# Patient Record
Sex: FEMALE | Race: BLACK OR AFRICAN AMERICAN
[De-identification: names, ages, dates, MRNs, and addresses within clinical notes are randomized per-mention and may not be internally consistent; named-entity substitution may affect disease eponyms.]

---

## 2018-03-23 ENCOUNTER — HOSPITAL ENCOUNTER (EMERGENCY)
Dept: HOSPITAL 17 - NEPE | Age: 23
LOS: 1 days | Discharge: HOME | End: 2018-03-24
Payer: SELF-PAY

## 2018-03-23 VITALS
HEART RATE: 129 BPM | SYSTOLIC BLOOD PRESSURE: 159 MMHG | OXYGEN SATURATION: 99 % | TEMPERATURE: 98.5 F | DIASTOLIC BLOOD PRESSURE: 74 MMHG | RESPIRATION RATE: 16 BRPM

## 2018-03-23 VITALS — BODY MASS INDEX: 35.92 KG/M2 | HEIGHT: 69 IN | WEIGHT: 242.51 LBS

## 2018-03-23 DIAGNOSIS — M79.642: ICD-10-CM

## 2018-03-23 DIAGNOSIS — M54.2: ICD-10-CM

## 2018-03-23 DIAGNOSIS — N92.1: Primary | ICD-10-CM

## 2018-03-23 LAB
ALBUMIN SERPL-MCNC: 4.1 GM/DL (ref 3.4–5)
ALP SERPL-CCNC: 73 U/L (ref 45–117)
ALT SERPL-CCNC: 25 U/L (ref 9–42)
AST SERPL-CCNC: 18 U/L (ref 16–38)
BASOPHILS # BLD AUTO: 0.1 TH/MM3 (ref 0–0.2)
BASOPHILS NFR BLD: 0.5 % (ref 0–2)
BILIRUB SERPL-MCNC: 0.2 MG/DL (ref 0.2–1)
BUN SERPL-MCNC: 10 MG/DL (ref 7–18)
CALCIUM SERPL-MCNC: 9.1 MG/DL (ref 8.5–10.1)
CHLORIDE SERPL-SCNC: 104 MEQ/L (ref 98–107)
CREAT SERPL-MCNC: 0.94 MG/DL (ref 0.5–1)
EOSINOPHIL # BLD: 0.1 TH/MM3 (ref 0–0.4)
EOSINOPHIL NFR BLD: 1.2 % (ref 0–4)
ERYTHROCYTE [DISTWIDTH] IN BLOOD BY AUTOMATED COUNT: 14.5 % (ref 11.6–17.2)
GFR SERPLBLD BASED ON 1.73 SQ M-ARVRAT: 92 ML/MIN (ref 89–?)
GLUCOSE SERPL-MCNC: 103 MG/DL (ref 74–106)
HCO3 BLD-SCNC: 29.1 MEQ/L (ref 21–32)
HCT VFR BLD CALC: 38.5 % (ref 35–46)
HGB BLD-MCNC: 13.3 GM/DL (ref 11.6–15.3)
LYMPHOCYTES # BLD AUTO: 3.3 TH/MM3 (ref 1–4.8)
LYMPHOCYTES NFR BLD AUTO: 31.4 % (ref 9–44)
MCH RBC QN AUTO: 28.7 PG (ref 27–34)
MCHC RBC AUTO-ENTMCNC: 34.4 % (ref 32–36)
MCV RBC AUTO: 83.4 FL (ref 80–100)
MONOCYTE #: 0.5 TH/MM3 (ref 0–0.9)
MONOCYTES NFR BLD: 4.7 % (ref 0–8)
NEUTROPHILS # BLD AUTO: 6.6 TH/MM3 (ref 1.8–7.7)
NEUTROPHILS NFR BLD AUTO: 62.2 % (ref 16–70)
PLATELET # BLD: 361 TH/MM3 (ref 150–450)
PMV BLD AUTO: 7.4 FL (ref 7–11)
PROT SERPL-MCNC: 8.8 GM/DL (ref 6.4–8.2)
RBC # BLD AUTO: 4.62 MIL/MM3 (ref 4–5.3)
SODIUM SERPL-SCNC: 141 MEQ/L (ref 136–145)
WBC # BLD AUTO: 10.6 TH/MM3 (ref 4–11)

## 2018-03-23 PROCEDURE — 85025 COMPLETE CBC W/AUTO DIFF WBC: CPT

## 2018-03-23 PROCEDURE — 83690 ASSAY OF LIPASE: CPT

## 2018-03-23 PROCEDURE — 80053 COMPREHEN METABOLIC PANEL: CPT

## 2018-03-23 PROCEDURE — 99284 EMERGENCY DEPT VISIT MOD MDM: CPT

## 2018-03-23 PROCEDURE — 84702 CHORIONIC GONADOTROPIN TEST: CPT

## 2018-03-23 PROCEDURE — 72050 X-RAY EXAM NECK SPINE 4/5VWS: CPT

## 2018-03-24 ENCOUNTER — HOSPITAL ENCOUNTER (EMERGENCY)
Dept: HOSPITAL 17 - NEPD | Age: 23
Discharge: LEFT BEFORE DISCHARGE | End: 2018-03-24
Payer: SELF-PAY

## 2018-03-24 VITALS
HEART RATE: 89 BPM | OXYGEN SATURATION: 97 % | SYSTOLIC BLOOD PRESSURE: 144 MMHG | TEMPERATURE: 97.5 F | DIASTOLIC BLOOD PRESSURE: 80 MMHG | RESPIRATION RATE: 16 BRPM

## 2018-03-24 VITALS — WEIGHT: 242.51 LBS | HEIGHT: 68 IN | BODY MASS INDEX: 36.75 KG/M2

## 2018-03-24 DIAGNOSIS — Z53.21: Primary | ICD-10-CM

## 2018-03-24 PROCEDURE — 99281 EMR DPT VST MAYX REQ PHY/QHP: CPT

## 2018-03-24 NOTE — PD
HPI


Chief Complaint:  GI Complaint


Time Seen by Provider:  21:52


Travel History


International Travel<30 days:  No


Contact w/Intl Traveler<30days:  No


Traveled to known affect area:  No





History of Present Illness


HPI


Patient is quite complaining of heavy vaginal bleeding for over months she says 

since her do ProveTreatment stopped over a year ago she's never had a normal 

period and she's having heavy vaginal bleeding which comes and goes raPatient 

is claiming complaining of heavy vaginal bleeding for over months she says 

since her depo  im 


Treatment stopped over a year ago she has never had a normal menstrual cycle 

menstrual cycle  And she is having heavy vaginal bleeding which comes and goes.

  Patient is also complaining of.  Patient is also complaining of left hand 

pain which she has been dealing with off and on for years she said she had an 

injury as a child and now she is worri that there is something wrong with her 

head ed left hand pain which she's been dealing with off and on for years she 

said she had an injury as a child and now she is worried there is something 

wrong with her hand





PFS


Past Medical History


Medical History:  Denies Significant Hx


Diminished Hearing:  No


Immunizations Current:  Yes


Influenza Vaccination:  No


Pregnant?:  Unknown


LMP:  UNKNOWN


:  0





Past Surgical History


Surgical History:  No Previous Surgery





Social History


Alcohol Use:  Yes (SOCIAL)


Tobacco Use:  No


Substance Use:  No





Allergies-Medications


(Allergen,Severity, Reaction):  


Coded Allergies:  


     No Known Allergies (Verified , 3/24/18)


Reported Meds & Prescriptions





Reported Meds & Active Scripts


Active


Ibuprofen 600 Mg Tab 600 Mg PO Q6H PRN


Reported


Depo-Provera Inj (Medroxyprogesterone Inj) 150 Mg/Ml Inj 150 Mg IM Q90D








Review of Systems


Except as stated in HPI:  all other systems reviewed are Neg


Genitourinary:  Positive: Menorrhagia, Metorrhagia





Physical Exam


Narrative


GENERAL: Nontoxic-appearing awake alertNontoxic appearing awake alert


SKIN: Warm and dry.


HEAD: Atraumatic. Normocephalic. 


EYES: Pupils equal and round. No scleral icterus. No injection or drainage. 


ENT: No nasal bleeding or discharge.  Mucous membranes pink and moist.


NECK: Trachea midline. No JVD. 


CARDIOVASCULAR: Regular rate and rhythm.  


RESPIRATORY: No accessory muscle use. Clear to auscultation. Breath sounds 

equal bilaterally. 


GASTROINTESTINAL: Abdomen mildly obese mildly obese soft, non-tender, 

nondistended. Hepatic and splenic margins not palpable. 


MUSCULOSKELETAL: Extremities without clubbing, cyanosis, or edema. No obvious 

deformities.  No deformity to her hand No deformity to her hand


NEUROLOGICAL: Awake and alert. No obvious cranial nerve deficits.  Motor 

grossly within normal limits. Five out of 5 muscle strength in the arms and 

legs.  Normal speech.


PSYCHIATRIC: Appropriate mood and affect; insight and judgment normal.





Data


Data


Last Documented VS





Vital Signs








  Date Time  Temp Pulse Resp B/P (MAP) Pulse Ox O2 Delivery O2 Flow Rate FiO2


 


3/24/18 01:00        


 


3/23/18 21:21 98.5 129 16  99 Room Air  








Orders





 Orders


Complete Blood Count With Diff (3/23/18 22:06)


Comprehensive Metabolic Panel (3/23/18 22:06)


Lipase (3/23/18 22:06)


Beta Hcg (Quant/Titer) (3/23/18 22:06)


Spine, Cervical Compl([id number]) (3/23/18 )


Ed Discharge Order (3/24/18 00:44)





Labs





Laboratory Tests








Test


  3/23/18


22:00


 


White Blood Count 10.6 TH/MM3 


 


Red Blood Count 4.62 MIL/MM3 


 


Hemoglobin 13.3 GM/DL 


 


Hematocrit 38.5 % 


 


Mean Corpuscular Volume 83.4 FL 


 


Mean Corpuscular Hemoglobin 28.7 PG 


 


Mean Corpuscular Hemoglobin


Concent 34.4 % 


 


 


Red Cell Distribution Width 14.5 % 


 


Platelet Count 361 TH/MM3 


 


Mean Platelet Volume 7.4 FL 


 


Neutrophils (%) (Auto) 62.2 % 


 


Lymphocytes (%) (Auto) 31.4 % 


 


Monocytes (%) (Auto) 4.7 % 


 


Eosinophils (%) (Auto) 1.2 % 


 


Basophils (%) (Auto) 0.5 % 


 


Neutrophils # (Auto) 6.6 TH/MM3 


 


Lymphocytes # (Auto) 3.3 TH/MM3 


 


Monocytes # (Auto) 0.5 TH/MM3 


 


Eosinophils # (Auto) 0.1 TH/MM3 


 


Basophils # (Auto) 0.1 TH/MM3 


 


CBC Comment DIFF FINAL 


 


Differential Comment  


 


Blood Urea Nitrogen 10 MG/DL 


 


Creatinine 0.94 MG/DL 


 


Random Glucose 103 MG/DL 


 


Total Protein 8.8 GM/DL 


 


Albumin 4.1 GM/DL 


 


Calcium Level 9.1 MG/DL 


 


Alkaline Phosphatase 73 U/L 


 


Aspartate Amino Transf


(AST/SGOT) 18 U/L 


 


 


Alanine Aminotransferase


(ALT/SGPT) 25 U/L 


 


 


Total Bilirubin 0.2 MG/DL 


 


Sodium Level 141 MEQ/L 


 


Potassium Level 3.4 MEQ/L 


 


Chloride Level 104 MEQ/L 


 


Carbon Dioxide Level 29.1 MEQ/L 


 


Anion Gap 8 MEQ/L 


 


Estimat Glomerular Filtration


Rate 92 ML/MIN 


 


 


Lipase 133 U/L 


 


Human Chorionic Gonadotropin,


Quant LESS THAN 1


MIU/ML











MDM


Medical Decision Making


Medical Screen Exam Complete:  Yes


Emergency Medical Condition:  Yes


Differential Diagnosis


pregnancy  threatened AB   missed AB   ,  hormone  imbalance  other ..


Narrative Course


Pt not pregnant and no sign of heavy vaginal bleed.   H and H normal  , not 

tachycardic  no hypotension, d/c follow up gyn





Diagnosis





 Primary Impression:  


 Metrorrhagia


Patient Instructions:  General Instructions, Menorrhagia (ED)


Scripts


Ibuprofen (Ibuprofen) 600 Mg Tab


600 MG PO Q6H Y for Pain/Inflammation, #20 TAB 0 Refills


   Prov: Jorge Araya MD         3/24/18











Jorge Araya MD Mar 24, 2018 00:51

## 2018-03-24 NOTE — RADRPT
EXAM DATE/TIME:  03/24/2018 00:01 

 

[facility] COMPARISON:     

No previous studies available for comparison.

 

                     

INDICATIONS :     

Neck pain on and off.

                     

 

MEDICAL HISTORY :     

None.          

 

SURGICAL HISTORY :     

None.   

 

ENCOUNTER:     

Initial                                        

 

ACUITY:     

7 - 11 months      

 

PAIN SCORE:     

2/10

 

LOCATION:     

Bilateral neck 

 

FINDINGS:     

Five view examination was performed.  There is normal alignment of the vertebral bodies down to the l
evel of C7.  There is mild reversal of the normal cervical lordosis. No evidence of fracture or sublu
xation.  Vertebral body height is normal.  The disc spaces are maintained.  The prevertebral soft tis
sues are of normal thickness.  The atlanto-axial articulation is intact.  The bony neural foramen are
 patent bilaterally.

 

CONCLUSION:     

1. Mild reversal of the normal cervical lordosis which may be due to muscular spasm and/or positionin
g.

2. No underlying bony abnormality.

 

 

 

 Gume Dominique MD on March 24, 2018 at 0:28           

Board Certified Radiologist.

 This report was verified electronically.

## 2018-04-01 ENCOUNTER — HOSPITAL ENCOUNTER (EMERGENCY)
Dept: HOSPITAL 17 - NEPC | Age: 23
Discharge: HOME | End: 2018-04-01
Payer: SELF-PAY

## 2018-04-01 VITALS
TEMPERATURE: 98.2 F | HEART RATE: 93 BPM | DIASTOLIC BLOOD PRESSURE: 63 MMHG | RESPIRATION RATE: 18 BRPM | OXYGEN SATURATION: 99 % | SYSTOLIC BLOOD PRESSURE: 127 MMHG

## 2018-04-01 VITALS — WEIGHT: 231.49 LBS | HEIGHT: 68 IN | BODY MASS INDEX: 35.08 KG/M2

## 2018-04-01 DIAGNOSIS — N39.0: Primary | ICD-10-CM

## 2018-04-01 LAB
ALBUMIN SERPL-MCNC: 4.3 GM/DL (ref 3.4–5)
ALP SERPL-CCNC: 78 U/L (ref 45–117)
ALT SERPL-CCNC: 29 U/L (ref 9–42)
AST SERPL-CCNC: 32 U/L (ref 16–38)
BACTERIA #/AREA URNS HPF: (no result) /HPF
BASOPHILS # BLD AUTO: 0.6 TH/MM3 (ref 0–0.2)
BASOPHILS NFR BLD AUTO: 1 % (ref 0–2)
BASOPHILS NFR BLD: 6.4 % (ref 0–2)
BILIRUB SERPL-MCNC: 0.6 MG/DL (ref 0.2–1)
BUN SERPL-MCNC: 9 MG/DL (ref 7–18)
CALCIUM SERPL-MCNC: 9.4 MG/DL (ref 8.5–10.1)
CHLORIDE SERPL-SCNC: 103 MEQ/L (ref 98–107)
COLOR UR: YELLOW
CREAT SERPL-MCNC: 0.97 MG/DL (ref 0.5–1)
CRP SERPL-MCNC: 2.1 MG/DL (ref 0–0.3)
EOSINOPHIL # BLD: 0.3 TH/MM3 (ref 0–0.4)
EOSINOPHIL NFR BLD: 3.9 % (ref 0–4)
ERYTHROCYTE [DISTWIDTH] IN BLOOD BY AUTOMATED COUNT: 14.2 % (ref 11.6–17.2)
GFR SERPLBLD BASED ON 1.73 SQ M-ARVRAT: 89 ML/MIN (ref 89–?)
GLUCOSE SERPL-MCNC: 94 MG/DL (ref 74–106)
GLUCOSE UR STRIP-MCNC: (no result) MG/DL
HCO3 BLD-SCNC: 28.6 MEQ/L (ref 21–32)
HCT VFR BLD CALC: 37.7 % (ref 35–46)
HGB BLD-MCNC: 12.8 GM/DL (ref 11.6–15.3)
HGB UR QL STRIP: (no result)
KETONES UR STRIP-MCNC: 10 MG/DL
LYMPHOCYTES # BLD AUTO: 1.9 TH/MM3 (ref 1–4.8)
LYMPHOCYTES NFR BLD AUTO: 22.2 % (ref 9–44)
LYMPHOCYTES: 41 % (ref 9–44)
MCH RBC QN AUTO: 27.8 PG (ref 27–34)
MCHC RBC AUTO-ENTMCNC: 33.8 % (ref 32–36)
MCV RBC AUTO: 82.2 FL (ref 80–100)
MONOCYTE #: 0.4 TH/MM3 (ref 0–0.9)
MONOCYTES NFR BLD: 5 % (ref 0–8)
MONOCYTES: 4 % (ref 0–8)
MUCOUS THREADS #/AREA URNS LPF: (no result) /LPF
NEUTROPHILS # BLD AUTO: 5.5 TH/MM3 (ref 1.8–7.7)
NEUTROPHILS NFR BLD AUTO: 62.5 % (ref 16–70)
NEUTS BAND # BLD MANUAL: 4.8 TH/MM3 (ref 1.8–7.7)
NEUTS SEG NFR BLD MANUAL: 54 % (ref 16–70)
NITRITE UR QL STRIP: (no result)
PLATELET # BLD: 370 TH/MM3 (ref 150–450)
PMV BLD AUTO: 7.8 FL (ref 7–11)
PROT SERPL-MCNC: 8.9 GM/DL (ref 6.4–8.2)
RBC # BLD AUTO: 4.59 MIL/MM3 (ref 4–5.3)
SODIUM SERPL-SCNC: 138 MEQ/L (ref 136–145)
SP GR UR STRIP: 1.04 (ref 1–1.03)
URINE LEUKOCYTE ESTERASE: (no result)
WBC # BLD AUTO: 8.8 TH/MM3 (ref 4–11)

## 2018-04-01 PROCEDURE — 84703 CHORIONIC GONADOTROPIN ASSAY: CPT

## 2018-04-01 PROCEDURE — 80053 COMPREHEN METABOLIC PANEL: CPT

## 2018-04-01 PROCEDURE — 85027 COMPLETE CBC AUTOMATED: CPT

## 2018-04-01 PROCEDURE — 99284 EMERGENCY DEPT VISIT MOD MDM: CPT

## 2018-04-01 PROCEDURE — 83690 ASSAY OF LIPASE: CPT

## 2018-04-01 PROCEDURE — 81001 URINALYSIS AUTO W/SCOPE: CPT

## 2018-04-01 PROCEDURE — 85007 BL SMEAR W/DIFF WBC COUNT: CPT

## 2018-04-01 PROCEDURE — 96360 HYDRATION IV INFUSION INIT: CPT

## 2018-04-01 PROCEDURE — 86140 C-REACTIVE PROTEIN: CPT

## 2018-04-01 NOTE — PD
HPI


Chief Complaint:  Back/ Neck Pain or Injury


Time Seen by Provider:  05:43


Travel History


International Travel<30 days:  No


Contact w/Intl Traveler<30days:  No


Traveled to known affect area:  No





History of Present Illness


HPI


The patient is a 22 year old female who presents to the [facility] 

emergency department with a history of low back pain that began in the 

afternoon on Saturday.  The patient reports that it is across the low back.  

She reports that she has had similar pain in the past when she has had a 

urinary tract infection.  She denies having any dysuria, however she reports 

having urinary frequency and urgency.  She denies having any recent fevers.  

She denies having any vomiting she denies having any vaginal discharge.  

Otherwise on review of systems, she denies having any recent cough or congestion

, neck pain, chest pain, shortness of breath, or neurologic symptoms. She has 

had chronic loss stools for 3 months, after eating.  LMP: unknown. She has been 

off of Depo Provera since 2016 and continues to have irregular menstrual 

cycles.





[facility]


Past Medical History


*** Narrative Medical


The patient's past medical history is significant for irregular menstrual 

cycles.


Medical History:  Denies Significant Hx


Diminished Hearing:  No


Immunizations Current:  Yes


Tetanus Vaccination:  Unknown


Influenza Vaccination:  No


Pregnant?:  Not Pregnant


LMP:  Depo


:  0





Past Surgical History


Surgical History:  No Previous Surgery





Social History


Alcohol Use:  Yes (SOCIAL)


Tobacco Use:  No


Substance Use:  No





Allergies-Medications


(Allergen,Severity, Reaction):  


Coded Allergies:  


     No Known Allergies (Verified , 18)


Reported Meds & Prescriptions





Reported Meds & Active Scripts


Active


Ibuprofen 600 Mg Tab 600 Mg PO Q6H PRN


Reported


Depo-Provera Inj (Medroxyprogesterone Inj) 150 Mg/Ml Inj 150 Mg IM Q90D








Review of Systems


Except as stated in HPI:  all other systems reviewed are Neg


General / Constitutional:  No: Fever


Eyes:  No: Visual changes


HENT:  No: Headaches


Cardiovascular:  No: Chest Pain or Discomfort


Respiratory:  No: Shortness of Breath


Gastrointestinal:  No: Abdominal Pain


Genitourinary:  Positive: Urgency, Frequency, No: Dysuria


Musculoskeletal:  Positive: Myalgias, No: Pain


Skin:  No Rash


Neurologic:  No: Weakness, Change in Mentation, Slurred Speech, Sensory 

Disturbance


Psychiatric:  No: Depression


Endocrine:  No: Polydipsia


Hematologic/Lymphatic:  No: Easy Bruising





Physical Exam


Narrative


General: 


The patient is a well-developed well-nourished female in no acute distress. 





Head and Neck exam: 


Head is normocephalic atraumatic. 


Eyes: EOMI, pupils are equal round and reactive to light. 


Nose: Midline septum with pink mucous membranes 


Mouth: Dentition unremarkable. Moist mucus membranes. Posterior oropharynx is 

not erythematous. No tonsillar hypertrophy. Uvula midline. Airway patent. 


Neck: No palpable lymphadenopathy. No nuchal rigidity. No thyromegaly. 





Cardiovascular: 


Regular rate and rhythm without murmurs, gallops, or rubs. 





Lungs: 


Clear to auscultation bilaterally. No wheezes, rhonchi, or rales.


 


Abdomen:


Soft, without tenderness to palpation in all 4 quadrants of the abdomen. No 

guarding, rebound, or rigidity.  Normal bowel sounds are audible.  No 

tenderness on palpation of McBurney's point.  Negative Rojas sign.





Extremities: 


No clubbing, cyanosis, or edema. 2+ pulses in all 4 extremities.  No calf 

tenderness on palpation.





Back: 


No spinous process tenderness to palpation.  The patient reports bilateral CVA 

tenderness on palpation.





Neurologic Exam: Grossly nonfocal





Skin Exam: No rash noted. Intact skin that is warm and dry.





Data


Data


Last Documented VS





Vital Signs








  Date Time  Temp Pulse Resp B/P (MAP) Pulse Ox O2 Delivery O2 Flow Rate FiO2


 


18 05:28 98.2 93 18 127/63 (84) 99   








Orders





 Orders


Complete Blood Count With Diff (18 05:43)


Comprehensive Metabolic Panel (18 05:43)


C-Reactive Protein (Crp) (18 05:43)


Lipase (18 05:43)


Urinalysis - C+S If Indicated (18 05:43)


Iv Access Insert/Monitor (18 05:43)


Ecg Monitoring (18 05:43)


Oximetry (18 05:43)


Ed Urine Pregnancytest Poc (18 05:43)


Ibuprofen (Motrin) (18 06:30)





Labs





Laboratory Tests








Test


  18


06:00


 


White Blood Count 8.8 TH/MM3 


 


Red Blood Count 4.59 MIL/MM3 


 


Hemoglobin 12.8 GM/DL 


 


Hematocrit 37.7 % 


 


Mean Corpuscular Volume 82.2 FL 


 


Mean Corpuscular Hemoglobin 27.8 PG 


 


Mean Corpuscular Hemoglobin


Concent 33.8 % 


 


 


Red Cell Distribution Width 14.2 % 


 


Platelet Count 370 TH/MM3 


 


Mean Platelet Volume 7.8 FL 


 


Neutrophils (%) (Auto) 62.5 % 


 


Lymphocytes (%) (Auto) 22.2 % 


 


Monocytes (%) (Auto) 5.0 % 


 


Eosinophils (%) (Auto) 3.9 % 


 


Basophils (%) (Auto) 6.4 % 


 


Neutrophils # (Auto) 5.5 TH/MM3 


 


Lymphocytes # (Auto) 1.9 TH/MM3 


 


Monocytes # (Auto) 0.4 TH/MM3 


 


Eosinophils # (Auto) 0.3 TH/MM3 


 


Basophils # (Auto) 0.6 TH/MM3 


 


CBC Comment AUTO DIFF 


 


Urine Color YELLOW 


 


Urine Turbidity HAZY 


 


Urine pH 6.0 


 


Urine Specific Gravity 1.036 


 


Urine Protein 30 mg/dL 


 


Urine Glucose (UA) NEG mg/dL 


 


Urine Ketones 10 mg/dL 


 


Urine Occult Blood SMALL 


 


Urine Nitrite NEG 


 


Urine Bilirubin NEG 


 


Urine Urobilinogen 2.0 MG/DL 


 


Urine Leukocyte Esterase MOD 


 


Urine RBC 1 /hpf 


 


Urine WBC 5 /hpf 


 


Urine Bacteria RARE /hpf 


 


Urine Mucus MANY /lpf 


 


Microscopic Urinalysis Comment


  CULT NOT


INDICATED


 


Blood Urea Nitrogen 9 MG/DL 


 


Creatinine 0.97 MG/DL 


 


Random Glucose 94 MG/DL 


 


Total Protein 8.9 GM/DL 


 


Albumin 4.3 GM/DL 


 


Calcium Level 9.4 MG/DL 


 


Alkaline Phosphatase 78 U/L 


 


Aspartate Amino Transf


(AST/SGOT) 32 U/L 


 


 


Alanine Aminotransferase


(ALT/SGPT) 29 U/L 


 


 


Total Bilirubin 0.6 MG/DL 


 


Sodium Level 138 MEQ/L 


 


Potassium Level 3.4 MEQ/L 


 


Chloride Level 103 MEQ/L 


 


Carbon Dioxide Level 28.6 MEQ/L 


 


Anion Gap 6 MEQ/L 


 


Estimat Glomerular Filtration


Rate 89 ML/MIN 


 


 


C-Reactive Protein 2.10 MG/DL 


 


Lipase 96 U/L 











[facility]


Medical Decision Making


Medical Screen Exam Complete:  Yes


Emergency Medical Condition:  Yes


Medical Record Reviewed:  Yes


Differential Diagnosis


Pregnancy, versus musculoskeletal strain, versus urinary tract infection, 

versus diabetes


Narrative Course


During the course of the patient's emergency department visit, the patient's 

history, examination, and differential diagnosis were reviewed with the 

patient. The patient was placed on a cardiac monitor with oximetry and frequent 

blood pressure monitoring. The patient had  IV access obtained and blood work 

sent for analysis. 





The patient was initially provided ibuprofen for pain.  She denies taking 

anything for pain prior to arrival.





The patient's laboratory studies were reviewed and remarkable for a white count 

of 8.8, hemoglobin 12.8, platelets 370 with a normal differential, CMP is 

remarkable for potassium of 3.4, C-reactive protein 2.1, lipase 96, urinalysis 

shows concentrated urine 30 protein 10 ketones small occult blood moderate 

leukocyte esterase 1 RBC 5 WBCs, rare bacteria.  The patient's bedside 

pregnancy test is negative.





The patient will be discharged home with a prescription for antibiotic as the 

patient has symptoms consistent with urinary tract infection and pyuria noted.  

The patient will be discharged home with Macrobid.





The patient is resting comfortably and feels better, is alert and in no 

distress. The patient's results and examination findings were discussed with 

the patient. The repeat examination is unremarkable and benign. The history, 

exam, diagnostic testing, and current condition do not suggest any significant 

pathology to warrant further testing, continued ED treatment, admission, or 

surgical evaluation at this point. The vital signs have been stable. The 

patient does not have uncontrollable pain, intractable vomiting, or other 

significant symptoms. The patient's condition is stable and appropriate for 

discharge. The patient will pursue further outpatient evaluation with a primary 

care physician or other designated or consulting physician as indicated in the 

discharge instructions. The patient expressed understanding and was agreeable 

with this plan.





Diagnosis





 Primary Impression:  


 Urinary tract infection


 Qualified Codes:  N39.0 - Urinary tract infection, site not specified; R31.9 - 

Hematuria, unspecified


Referrals:  


Gynecologist


1 week





[facility]


1 week





Primary Care Physician


3 days


Patient Instructions:  General Instructions, Urinary Tract Infection in Women (

ED)


***Med/Other Pt SpecificInfo:  Prescription(s) given


Scripts


Nitrofurantoin Monohydrate Macrocrystals (Macrobid) 100 Mg Capsule


100 MG PO BID for Infection for 10 Days, #20 CAP 0 Refills


   Prov: Tatiana Ruano MD         18


Disposition:  01 DISCHARGE HOME


Condition:  Stable











Tatiana Ruano MD 2018 06:00

## 2018-04-05 ENCOUNTER — HOSPITAL ENCOUNTER (EMERGENCY)
Dept: HOSPITAL 17 - NEPE | Age: 23
Discharge: HOME | End: 2018-04-05
Payer: SELF-PAY

## 2018-04-05 VITALS — WEIGHT: 240.52 LBS | BODY MASS INDEX: 36.45 KG/M2 | HEIGHT: 68 IN

## 2018-04-05 VITALS
HEART RATE: 102 BPM | DIASTOLIC BLOOD PRESSURE: 79 MMHG | RESPIRATION RATE: 18 BRPM | SYSTOLIC BLOOD PRESSURE: 141 MMHG | TEMPERATURE: 97.7 F | OXYGEN SATURATION: 100 %

## 2018-04-05 DIAGNOSIS — M79.671: ICD-10-CM

## 2018-04-05 DIAGNOSIS — M79.672: Primary | ICD-10-CM

## 2018-04-05 DIAGNOSIS — M79.89: ICD-10-CM

## 2018-04-05 PROCEDURE — 99283 EMERGENCY DEPT VISIT LOW MDM: CPT

## 2018-04-05 NOTE — PD
HPI


Chief Complaint:  Edema


Time Seen by Provider:  05:52


Travel History


International Travel<30 days:  No


Contact w/Intl Traveler<30days:  No


Traveled to known affect area:  No





History of Present Illness


HPI


Patient is a 22-year-old female presents emergency department for bilateral 

foot swelling and pain.  Patient recently on antibiotics for upper respiratory 

type symptoms.  No fevers no trauma no history of long stasis periods.  Patient 

states pain started a few days ago, gradually worsening.  She currently is 

unemployed does not spend an inordinate amount of time on her feet.  No history 

of gout.  No history of arthritis according to her peer





[facility]


Past Medical History


Medical History:  Denies Significant Hx


Diminished Hearing:  No


Immunizations Current:  Yes


Tetanus Vaccination:  < 5 Years


Influenza Vaccination:  No


Pregnant?:  Unknown


LMP:  2018


:  0


Para:  0





Past Surgical History


Surgical History:  No Previous Surgery





Social History


Alcohol Use:  No


Tobacco Use:  No


Substance Use:  No





Allergies-Medications


(Allergen,Severity, Reaction):  


Uncoded Allergies:  


     UNKNOWN ANTIBIOTIC (Allergy, Severe, Hives, 18)


Reported Meds & Prescriptions





Reported Meds & Active Scripts


Active


Ibuprofen 800 Mg Tab 800 Mg PO Q6HR PRN


Prednisone 20 Mg Tab 60 Mg PO DAILY 5 Days


Macrobid (Nitrofurantoin Monohydrate Macrocrystals) 100 Mg Capsule 100 Mg PO 

BID 10 Days


Ibuprofen 600 Mg Tab 600 Mg PO Q6H PRN


Reported


Depo-Provera Inj (Medroxyprogesterone Inj) 150 Mg/Ml Inj 150 Mg IM Q90D








Review of Systems


Except as stated in HPI:  all other systems reviewed are Neg





Physical Exam


Narrative


GENERAL: Well-nourished, well-developed patient.


SKIN: Focused skin assessment warm/dry.


HEAD: Normocephalic.


EYES: No scleral icterus. No injection or drainage. 


NECK: Supple, trachea midline. No JVD or lymphadenopathy.


CARDIOVASCULAR: Regular rate and rhythm without murmurs, gallops, or rubs. 


RESPIRATORY: Breath sounds equal bilaterally. No accessory muscle use.


GASTROINTESTINAL: Abdomen soft, non-tender, nondistended. 


MUSCULOSKELETAL: No cyanosis, there is perhaps trace edema bilateral lower 

extremities from ankles distally, is bilaterally equal, 2+ bilateral equal 

pulses and DPs and PTs.  Homans sign negative.  There is no calf swelling at 

all.  The feet are somewhat warm, there is some tenderness over the first MTP 

joint bilaterally.


BACK: Nontender without obvious deformity. No CVA tenderness.





Data


Data


Last Documented VS





Vital Signs








  Date Time  Temp Pulse Resp B/P (MAP) Pulse Ox O2 Delivery O2 Flow Rate FiO2


 


18 06:02  102 16  100   


 


18 05:44 97.7   141/79 (99)    








Orders





 Orders


Ibuprofen (Motrin) (18 06:15)


Ed Discharge Order (18 06:10)








MDM


Medical Decision Making


Medical Screen Exam Complete:  Yes


Emergency Medical Condition:  Yes


Differential Diagnosis


Arthritis, Gouty arthritis, Infectious arthritis unlikely.


Narrative Course


Patient room to the emergency department, symptoms suggestive of reactive 

arthritis consider bilateral gout although this would be somewhat unusual to 

have bilateral abruption.  However I think that given her exam reactive 

arthritis is a possibility.  Discussed empiric steroids, symptomatically 

management follow-up with a primary care physician.  She is stable for 

discharge per





Diagnosis





 Primary Impression:  


 Foot pain, bilateral


***Med/Other Pt SpecificInfo:  Prescription(s) given


Scripts


Prednisone (Prednisone) 20 Mg Tab


60 MG PO DAILY for 5 Days, #15 TAB 0 Refills


   Prov: Brian Carvajal MD         18


Disposition:  01 DISCHARGE HOME


Condition:  Stable











Brian Carvajal MD 2018 06:10

## 2018-04-06 ENCOUNTER — HOSPITAL ENCOUNTER (EMERGENCY)
Dept: HOSPITAL 17 - NEPD | Age: 23
LOS: 1 days | Discharge: HOME | End: 2018-04-07
Payer: SELF-PAY

## 2018-04-06 ENCOUNTER — HOSPITAL ENCOUNTER (EMERGENCY)
Dept: HOSPITAL 17 - NED | Age: 23
Discharge: LEFT BEFORE DISCHARGE | End: 2018-04-06
Payer: SELF-PAY

## 2018-04-06 VITALS — BODY MASS INDEX: 30.74 KG/M2 | HEIGHT: 68 IN | WEIGHT: 202.83 LBS

## 2018-04-06 VITALS
DIASTOLIC BLOOD PRESSURE: 74 MMHG | SYSTOLIC BLOOD PRESSURE: 133 MMHG | RESPIRATION RATE: 18 BRPM | OXYGEN SATURATION: 100 % | TEMPERATURE: 98.6 F | HEART RATE: 87 BPM

## 2018-04-06 VITALS
RESPIRATION RATE: 16 BRPM | OXYGEN SATURATION: 100 % | HEART RATE: 76 BPM | SYSTOLIC BLOOD PRESSURE: 128 MMHG | TEMPERATURE: 97.4 F | DIASTOLIC BLOOD PRESSURE: 73 MMHG

## 2018-04-06 VITALS — HEIGHT: 68 IN | BODY MASS INDEX: 30.74 KG/M2 | WEIGHT: 202.83 LBS

## 2018-04-06 DIAGNOSIS — Z53.21: ICD-10-CM

## 2018-04-06 DIAGNOSIS — M79.89: Primary | ICD-10-CM

## 2018-04-06 DIAGNOSIS — R60.0: ICD-10-CM

## 2018-04-06 DIAGNOSIS — Z79.3: ICD-10-CM

## 2018-04-06 DIAGNOSIS — M25.572: ICD-10-CM

## 2018-04-06 DIAGNOSIS — M25.571: Primary | ICD-10-CM

## 2018-04-06 PROCEDURE — 86140 C-REACTIVE PROTEIN: CPT

## 2018-04-06 PROCEDURE — 85025 COMPLETE CBC W/AUTO DIFF WBC: CPT

## 2018-04-06 PROCEDURE — 99281 EMR DPT VST MAYX REQ PHY/QHP: CPT

## 2018-04-06 PROCEDURE — 73610 X-RAY EXAM OF ANKLE: CPT

## 2018-04-06 PROCEDURE — 96374 THER/PROPH/DIAG INJ IV PUSH: CPT

## 2018-04-06 PROCEDURE — 80048 BASIC METABOLIC PNL TOTAL CA: CPT

## 2018-04-06 PROCEDURE — 99284 EMERGENCY DEPT VISIT MOD MDM: CPT

## 2018-04-06 PROCEDURE — 86430 RHEUMATOID FACTOR TEST QUAL: CPT

## 2018-04-06 PROCEDURE — 85652 RBC SED RATE AUTOMATED: CPT

## 2018-04-06 NOTE — PD
HPI


Chief Complaint:  Edema


Time Seen by Provider:  23:21


Travel History


International Travel<30 days:  No


Contact w/Intl Traveler<30days:  No


Traveled to known affect area:  No





History of Present Illness


HPI


22-year-old female came to the emergency room with history of bilateral ankle 

swelling and pain for past 3 days.  Patient was in the emergency room 2 days 

ago when she was evaluated and was asked to keep her feet elevated.  She did 

not find any relief or improvement of her symptoms from it.  She is back here 

since the condition continues.  Vital signs were stable.  Patient denies having 

these symptoms in the past.  However when I look back at her past medical 

history I noticed that at one point she was being worked up for polyarthritis.  

This was when patient was 7 years old.  She even went through OT for that.  

Patient does not recall the details about this since she was very young.





PFSH


Past Medical History


*** Narrative Medical


List of her past medical, surgical, social and family history is reviewed from 

the nursing note.


Diminished Hearing:  No


Immunizations Current:  Yes


Pregnant?:  Not Pregnant


LMP:  UNKNOWN


:  0


Para:  0





Social History


Alcohol Use:  No


Tobacco Use:  No


Substance Use:  No





Allergies-Medications


(Allergen,Severity, Reaction):  


Uncoded Allergies:  


     UNKNOWN ANTIBIOTIC (Allergy, Severe, Hives, 18)


Comments


List of her allergies reviewed from the nursing


Reported Meds & Prescriptions





Reported Meds & Active Scripts


Active


Ibuprofen 800 Mg Tab 800 Mg PO Q6HR PRN


Prednisone 20 Mg Tab 60 Mg PO DAILY 5 Days


Macrobid (Nitrofurantoin Monohydrate Macrocrystals) 100 Mg Capsule 100 Mg PO 

BID 10 Days


Ibuprofen 600 Mg Tab 600 Mg PO Q6H PRN


Reported


Depo-Provera Inj (Medroxyprogesterone Inj) 150 Mg/Ml Inj 150 Mg IM Q90D





Narrative Medication


List of her home medications reviewed from the nursing note.





Review of Systems


Except as stated in HPI:  all other systems reviewed are Neg


Musculoskeletal:  Positive: Edema, Pain





Physical Exam


Narrative


GENERAL: Awake, alert, no obvious distress


SKIN: Focused skin assessment warm/dry.


HEAD: Atraumatic. Normocephalic. 


EYES: Pupils equal and round. No scleral icterus. No injection or drainage. 


ENT: No nasal bleeding or discharge.  Mucous membranes pink and moist.


NECK: Trachea midline. No JVD. 


CARDIOVASCULAR: Regular rate and rhythm.  No murmur appreciated.


RESPIRATORY: No accessory muscle use. Clear to auscultation. Breath sounds 

equal bilaterally. 


GASTROINTESTINAL: Abdomen soft, non-tender, nondistended. Hepatic and splenic 

margins not palpable. 


MUSCULOSKELETAL: No obvious deformities. No clubbing.  No cyanosis.  Bilateral 

ankle swelling and some swelling of the dorsum of her feet.  Ankle and the 

dorsum of the foot is tender to touch.  No erythema or warmth of the skin


NEUROLOGICAL: Awake and alert. No obvious cranial nerve deficits.  Motor 

grossly within normal limits. Normal speech.


PSYCHIATRIC: Appropriate mood and affect; insight and judgment normal.





Data


Data


Last Documented VS





Vital Signs








  Date Time  Temp Pulse Resp B/P (MAP) Pulse Ox O2 Delivery O2 Flow Rate FiO2


 


18 23:10 97.4 76 16 128/73 (91) 100   








Orders





 Orders


Ankle, Complete ([id number]) (18 )


Ankle, Complete ([id number]) (18 )


Complete Blood Count With Diff (18 23:31)


Basic Metabolic Panel (Bmp) (18 23:31)


Westergren Sedimentation Rate (18 23:40)


Rheumatoid Screen/Titer (Rf) (18 23:51)


Ketorolac Inj (Toradol Inj) (18 00:00)


C-Reactive Protein (Crp) (18 00:47)


Ed Discharge Order (18 03:06)





Labs





Laboratory Tests








Test


  18


00:47


 


White Blood Count 7.7 TH/MM3 


 


Red Blood Count 4.52 MIL/MM3 


 


Hemoglobin 12.1 GM/DL 


 


Hematocrit 37.0 % 


 


Mean Corpuscular Volume 81.9 FL 


 


Mean Corpuscular Hemoglobin 26.8 PG 


 


Mean Corpuscular Hemoglobin


Concent 32.7 % 


 


 


Red Cell Distribution Width 14.3 % 


 


Platelet Count 373 TH/MM3 


 


Mean Platelet Volume 7.4 FL 


 


Neutrophils (%) (Auto) 48.4 % 


 


Lymphocytes (%) (Auto) 40.8 % 


 


Monocytes (%) (Auto) 6.6 % 


 


Eosinophils (%) (Auto) 3.8 % 


 


Basophils (%) (Auto) 0.4 % 


 


Neutrophils # (Auto) 3.7 TH/MM3 


 


Lymphocytes # (Auto) 3.1 TH/MM3 


 


Monocytes # (Auto) 0.5 TH/MM3 


 


Eosinophils # (Auto) 0.3 TH/MM3 


 


Basophils # (Auto) 0.0 TH/MM3 


 


CBC Comment DIFF FINAL 


 


Differential Comment  


 


Erythrocyte Sedimentation Rate 21 mm/hr 


 


Blood Urea Nitrogen 7 MG/DL 


 


Creatinine 0.86 MG/DL 


 


Random Glucose 81 MG/DL 


 


Calcium Level 8.9 MG/DL 


 


Sodium Level 139 MEQ/L 


 


Potassium Level 3.6 MEQ/L 


 


Chloride Level 103 MEQ/L 


 


Carbon Dioxide Level 31.2 MEQ/L 


 


Anion Gap 5 MEQ/L 


 


Estimat Glomerular Filtration


Rate 102 ML/MIN 


 


 


C-Reactive Protein 1.18 MG/DL 


 


Rheumatoid Factor Screen NEGATIVE 


 


Rheumatoid Factor Titer  IU/ML 











MDM


Medical Decision Making


Medical Screen Exam Complete:  Yes


Emergency Medical Condition:  Yes


Medical Record Reviewed:  Yes


Differential Diagnosis


Polyarthritis, tendinitis


Narrative Course


12:26 PM x-ray and blood test has been ordered.  Waiting for the test to be 

resulted.  Case will be signed over to the PA.





Procedures


EKG Prior to Arrival:  No


Scripts


Ibuprofen (Ibuprofen) 800 Mg Tab


800 MG PO Q6HR Y for PAIN, #40 TAB 0 Refills


   Prov: Harriet Jc         18











Shira Carranza MD 2018 23:22

## 2018-04-07 LAB
BASOPHILS # BLD AUTO: 0 TH/MM3 (ref 0–0.2)
BASOPHILS NFR BLD: 0.4 % (ref 0–2)
BUN SERPL-MCNC: 7 MG/DL (ref 7–18)
CALCIUM SERPL-MCNC: 8.9 MG/DL (ref 8.5–10.1)
CHLORIDE SERPL-SCNC: 103 MEQ/L (ref 98–107)
CREAT SERPL-MCNC: 0.86 MG/DL (ref 0.5–1)
CRP SERPL-MCNC: 1.18 MG/DL (ref 0–0.3)
EOSINOPHIL # BLD: 0.3 TH/MM3 (ref 0–0.4)
EOSINOPHIL NFR BLD: 3.8 % (ref 0–4)
ERYTHROCYTE [DISTWIDTH] IN BLOOD BY AUTOMATED COUNT: 14.3 % (ref 11.6–17.2)
GFR SERPLBLD BASED ON 1.73 SQ M-ARVRAT: 102 ML/MIN (ref 89–?)
GLUCOSE SERPL-MCNC: 81 MG/DL (ref 74–106)
HCO3 BLD-SCNC: 31.2 MEQ/L (ref 21–32)
HCT VFR BLD CALC: 37 % (ref 35–46)
HGB BLD-MCNC: 12.1 GM/DL (ref 11.6–15.3)
LYMPHOCYTES # BLD AUTO: 3.1 TH/MM3 (ref 1–4.8)
LYMPHOCYTES NFR BLD AUTO: 40.8 % (ref 9–44)
MCH RBC QN AUTO: 26.8 PG (ref 27–34)
MCHC RBC AUTO-ENTMCNC: 32.7 % (ref 32–36)
MCV RBC AUTO: 81.9 FL (ref 80–100)
MONOCYTE #: 0.5 TH/MM3 (ref 0–0.9)
MONOCYTES NFR BLD: 6.6 % (ref 0–8)
NEUTROPHILS # BLD AUTO: 3.7 TH/MM3 (ref 1.8–7.7)
NEUTROPHILS NFR BLD AUTO: 48.4 % (ref 16–70)
PLATELET # BLD: 373 TH/MM3 (ref 150–450)
PMV BLD AUTO: 7.4 FL (ref 7–11)
RBC # BLD AUTO: 4.52 MIL/MM3 (ref 4–5.3)
RHEUMATOID FACT SER QL LA: NEGATIVE
SODIUM SERPL-SCNC: 139 MEQ/L (ref 136–145)
WBC # BLD AUTO: 7.7 TH/MM3 (ref 4–11)

## 2018-04-07 NOTE — RADRPT
EXAM DATE/TIME:  04/06/2018 23:53 

 

[facility] COMPARISON:     

No previous studies available for comparison.

 

                     

INDICATIONS :     

Bilateral nontraumatic pain and swelling of the ankles and feet.

                     

 

MEDICAL HISTORY :     

None.          

 

SURGICAL HISTORY :     

None.   

 

ENCOUNTER:     

Initial                                        

 

ACUITY:     

3 days      

 

PAIN SCORE:     

8/10

 

LOCATION:     

Bilateral  ankles

 

FINDINGS:     

3 views left ankle. Bone alignment within normal limits.  No evidence of fracture. Ankle mortise inta
ct. No joint narrowing. No focal bone erosion.

 

CONCLUSION:     

Ankle series within normal limits.

 

 

 

 Hong Gordon MD on April 07, 2018 at 0:35           

Board Certified Radiologist.

 This report was verified electronically.

## 2018-04-07 NOTE — PD
Physical Exam


Date Seen by Provider:  Apr 7, 2018


Time Seen by Provider:  03:07


Narrative


For full history and physical examination please see previous providers note.  

I assumed care of patient at change of shift.  At that time labs are pending.





Data


Data


Last Documented VS





Vital Signs








  Date Time  Temp Pulse Resp B/P (MAP) Pulse Ox O2 Delivery O2 Flow Rate FiO2


 


4/6/18 23:10 97.4 76 16 128/73 (91) 100   








Orders





 Orders


Ankle, Complete ([id number]) (4/6/18 )


Ankle, Complete ([id number]) (4/6/18 )


Complete Blood Count With Diff (4/6/18 23:31)


Basic Metabolic Panel (Bmp) (4/6/18 23:31)


Westergren Sedimentation Rate (4/6/18 23:40)


Rheumatoid Screen/Titer (Rf) (4/6/18 23:51)


Ketorolac Inj (Toradol Inj) (4/7/18 00:00)


C-Reactive Protein (Crp) (4/7/18 00:47)


Ed Discharge Order (4/7/18 03:06)





Labs





Laboratory Tests








Test


  4/7/18


00:47


 


White Blood Count 7.7 TH/MM3 


 


Red Blood Count 4.52 MIL/MM3 


 


Hemoglobin 12.1 GM/DL 


 


Hematocrit 37.0 % 


 


Mean Corpuscular Volume 81.9 FL 


 


Mean Corpuscular Hemoglobin 26.8 PG 


 


Mean Corpuscular Hemoglobin


Concent 32.7 % 


 


 


Red Cell Distribution Width 14.3 % 


 


Platelet Count 373 TH/MM3 


 


Mean Platelet Volume 7.4 FL 


 


Neutrophils (%) (Auto) 48.4 % 


 


Lymphocytes (%) (Auto) 40.8 % 


 


Monocytes (%) (Auto) 6.6 % 


 


Eosinophils (%) (Auto) 3.8 % 


 


Basophils (%) (Auto) 0.4 % 


 


Neutrophils # (Auto) 3.7 TH/MM3 


 


Lymphocytes # (Auto) 3.1 TH/MM3 


 


Monocytes # (Auto) 0.5 TH/MM3 


 


Eosinophils # (Auto) 0.3 TH/MM3 


 


Basophils # (Auto) 0.0 TH/MM3 


 


CBC Comment DIFF FINAL 


 


Differential Comment  


 


Erythrocyte Sedimentation Rate 21 mm/hr 


 


Blood Urea Nitrogen 7 MG/DL 


 


Creatinine 0.86 MG/DL 


 


Random Glucose 81 MG/DL 


 


Calcium Level 8.9 MG/DL 


 


Sodium Level 139 MEQ/L 


 


Potassium Level 3.6 MEQ/L 


 


Chloride Level 103 MEQ/L 


 


Carbon Dioxide Level 31.2 MEQ/L 


 


Anion Gap 5 MEQ/L 


 


Estimat Glomerular Filtration


Rate 102 ML/MIN 


 


 


C-Reactive Protein 1.18 MG/DL 


 


Rheumatoid Factor Screen NEGATIVE 


 


Rheumatoid Factor Titer  IU/ML 











[facility]


Medical Record Reviewed:  Yes


Supervised Visit with SAVANNA:  Yes


Interpretation(s)





Laboratory Tests








Test


  4/7/18


00:47


 


White Blood Count 7.7 TH/MM3 


 


Red Blood Count 4.52 MIL/MM3 


 


Hemoglobin 12.1 GM/DL 


 


Hematocrit 37.0 % 


 


Mean Corpuscular Volume 81.9 FL 


 


Mean Corpuscular Hemoglobin 26.8 PG 


 


Mean Corpuscular Hemoglobin


Concent 32.7 % 


 


 


Red Cell Distribution Width 14.3 % 


 


Platelet Count 373 TH/MM3 


 


Mean Platelet Volume 7.4 FL 


 


Neutrophils (%) (Auto) 48.4 % 


 


Lymphocytes (%) (Auto) 40.8 % 


 


Monocytes (%) (Auto) 6.6 % 


 


Eosinophils (%) (Auto) 3.8 % 


 


Basophils (%) (Auto) 0.4 % 


 


Neutrophils # (Auto) 3.7 TH/MM3 


 


Lymphocytes # (Auto) 3.1 TH/MM3 


 


Monocytes # (Auto) 0.5 TH/MM3 


 


Eosinophils # (Auto) 0.3 TH/MM3 


 


Basophils # (Auto) 0.0 TH/MM3 


 


CBC Comment DIFF FINAL 


 


Differential Comment  


 


Erythrocyte Sedimentation Rate 21 mm/hr 


 


Blood Urea Nitrogen 7 MG/DL 


 


Creatinine 0.86 MG/DL 


 


Random Glucose 81 MG/DL 


 


Calcium Level 8.9 MG/DL 


 


Sodium Level 139 MEQ/L 


 


Potassium Level 3.6 MEQ/L 


 


Chloride Level 103 MEQ/L 


 


Carbon Dioxide Level 31.2 MEQ/L 


 


Anion Gap 5 MEQ/L 


 


Estimat Glomerular Filtration


Rate 102 ML/MIN 


 


 


C-Reactive Protein 1.18 MG/DL 


 


Rheumatoid Factor Screen NEGATIVE 


 


Rheumatoid Factor Titer  IU/ML 








Vital Signs








  Date Time  Temp Pulse Resp B/P (MAP) Pulse Ox O2 Delivery O2 Flow Rate FiO2


 


4/6/18 23:10 97.4 76 16 128/73 (91) 100   








Narrative Course


Patient is a 22-year-old female presenting to the emergency department for 

evaluation of bilateral ankle pain and swelling.  CBC with no acute findings, 

CRP and sed rate are mildly elevated.  Chemistry is unremarkable.  Rheumatoid 

factor is negative.  Patient will be discharged home, she is encouraged to 

continue course of steroids as previously prescribed.  She does report that the 

ibuprofen helps the pain.  She is encouraged to continue this as needed and as 

directed.  She was advised to follow-up at the [facility] for further 

evaluation.  She was encouraged to return to emergency department for any new 

or worsening symptoms.  Patient verbalized understanding of instructions.  

Patient stable for discharge.


Diagnosis





 Primary Impression:  


 Arthralgia of ankle


 Qualified Codes:  M25.571 - Pain in right ankle and joints of right foot; 

M25.572 - Pain in left ankle and joints of left foot


 Additional Impression:  


 Edema


 Qualified Codes:  R60.0 - Localized edema


Referrals:  


[facility]


2 days


Patient Instructions:  Arthralgia (ED), General Instructions, Osteoarthritis (DC

), Rheumatoid Arthritis (ED)





***Additional Instruction:  


Follow-up at the [facility], call on Monday to schedule a follow-up 

appointment


Continue medications as previously prescribed


Take ibuprofen as needed and as directed for pain


Return to emergency department for any new or worsening symptoms


***Med/Other Pt SpecificInfo:  Prescription(s) given


Scripts


Ibuprofen (Ibuprofen) 800 Mg Tab


800 MG PO Q6HR Y for PAIN, #40 TAB 0 Refills


   Prov: Harriet Jc         4/7/18


Disposition:  01 DISCHARGE HOME


Condition:  Stable











Harriet Jc Apr 7, 2018 03:11

## 2018-04-07 NOTE — RADRPT
EXAM DATE/TIME:  04/06/2018 23:52 

 

[facility] COMPARISON:     

No previous studies available for comparison.

 

                     

INDICATIONS :     

Bilateral nontraumatic pain and swelling of the ankles and feet.

                     

 

MEDICAL HISTORY :     

None.          

 

SURGICAL HISTORY :     

None.   

 

ENCOUNTER:     

Initial                                        

 

ACUITY:     

3 days      

 

PAIN SCORE:     

8/10

 

LOCATION:     

Bilateral  ankles

 

FINDINGS:     

3 views of the right ankle. Bone alignment within normal limits.  No evidence of fracture. Ankle mort
ise intact. No focal bone erosion. No joint narrowing.

 

CONCLUSION:     Ankle series within normal limits. 

 

 

 Hong Gordon MD on April 07, 2018 at 0:33           

Board Certified Radiologist.

 This report was verified electronically.

## 2018-04-12 ENCOUNTER — HOSPITAL ENCOUNTER (EMERGENCY)
Dept: HOSPITAL 17 - NEPC | Age: 23
Discharge: HOME | End: 2018-04-12
Payer: SELF-PAY

## 2018-04-12 VITALS
DIASTOLIC BLOOD PRESSURE: 57 MMHG | OXYGEN SATURATION: 100 % | HEART RATE: 83 BPM | SYSTOLIC BLOOD PRESSURE: 126 MMHG | RESPIRATION RATE: 16 BRPM

## 2018-04-12 VITALS
RESPIRATION RATE: 18 BRPM | SYSTOLIC BLOOD PRESSURE: 148 MMHG | DIASTOLIC BLOOD PRESSURE: 67 MMHG | TEMPERATURE: 98.2 F | OXYGEN SATURATION: 100 % | HEART RATE: 116 BPM

## 2018-04-12 VITALS — BODY MASS INDEX: 36.19 KG/M2 | WEIGHT: 238.76 LBS | HEIGHT: 68 IN

## 2018-04-12 DIAGNOSIS — R06.02: ICD-10-CM

## 2018-04-12 DIAGNOSIS — M79.89: ICD-10-CM

## 2018-04-12 DIAGNOSIS — R60.0: Primary | ICD-10-CM

## 2018-04-12 LAB
ALBUMIN SERPL-MCNC: 4 GM/DL (ref 3.4–5)
ALP SERPL-CCNC: 68 U/L (ref 45–117)
ALT SERPL-CCNC: 32 U/L (ref 9–42)
AST SERPL-CCNC: 26 U/L (ref 16–38)
BASOPHILS # BLD AUTO: 0 TH/MM3 (ref 0–0.2)
BASOPHILS NFR BLD: 0.5 % (ref 0–2)
BILIRUB SERPL-MCNC: 0.4 MG/DL (ref 0.2–1)
BUN SERPL-MCNC: 10 MG/DL (ref 7–18)
CALCIUM SERPL-MCNC: 9.3 MG/DL (ref 8.5–10.1)
CHLORIDE SERPL-SCNC: 106 MEQ/L (ref 98–107)
CREAT SERPL-MCNC: 0.95 MG/DL (ref 0.5–1)
EOSINOPHIL # BLD: 0.1 TH/MM3 (ref 0–0.4)
EOSINOPHIL NFR BLD: 2 % (ref 0–4)
ERYTHROCYTE [DISTWIDTH] IN BLOOD BY AUTOMATED COUNT: 14.3 % (ref 11.6–17.2)
GFR SERPLBLD BASED ON 1.73 SQ M-ARVRAT: 91 ML/MIN (ref 89–?)
GLUCOSE SERPL-MCNC: 86 MG/DL (ref 74–106)
HCO3 BLD-SCNC: 29.2 MEQ/L (ref 21–32)
HCT VFR BLD CALC: 37 % (ref 35–46)
HGB BLD-MCNC: 12.5 GM/DL (ref 11.6–15.3)
INR PPP: 1.1 RATIO
LYMPHOCYTES # BLD AUTO: 2.5 TH/MM3 (ref 1–4.8)
LYMPHOCYTES NFR BLD AUTO: 33.2 % (ref 9–44)
MCH RBC QN AUTO: 27.6 PG (ref 27–34)
MCHC RBC AUTO-ENTMCNC: 33.6 % (ref 32–36)
MCV RBC AUTO: 82 FL (ref 80–100)
MONOCYTE #: 0.4 TH/MM3 (ref 0–0.9)
MONOCYTES NFR BLD: 4.8 % (ref 0–8)
NEUTROPHILS # BLD AUTO: 4.4 TH/MM3 (ref 1.8–7.7)
NEUTROPHILS NFR BLD AUTO: 59.5 % (ref 16–70)
PLATELET # BLD: 384 TH/MM3 (ref 150–450)
PMV BLD AUTO: 7.3 FL (ref 7–11)
PROT SERPL-MCNC: 8.4 GM/DL (ref 6.4–8.2)
PROTHROMBIN TIME: 11.1 SEC (ref 9.8–11.6)
RBC # BLD AUTO: 4.52 MIL/MM3 (ref 4–5.3)
SODIUM SERPL-SCNC: 141 MEQ/L (ref 136–145)
WBC # BLD AUTO: 7.4 TH/MM3 (ref 4–11)

## 2018-04-12 PROCEDURE — 83880 ASSAY OF NATRIURETIC PEPTIDE: CPT

## 2018-04-12 PROCEDURE — 84702 CHORIONIC GONADOTROPIN TEST: CPT

## 2018-04-12 PROCEDURE — 85610 PROTHROMBIN TIME: CPT

## 2018-04-12 PROCEDURE — 93970 EXTREMITY STUDY: CPT

## 2018-04-12 PROCEDURE — 80053 COMPREHEN METABOLIC PANEL: CPT

## 2018-04-12 PROCEDURE — 85730 THROMBOPLASTIN TIME PARTIAL: CPT

## 2018-04-12 PROCEDURE — 71045 X-RAY EXAM CHEST 1 VIEW: CPT

## 2018-04-12 PROCEDURE — 96374 THER/PROPH/DIAG INJ IV PUSH: CPT

## 2018-04-12 PROCEDURE — 99284 EMERGENCY DEPT VISIT MOD MDM: CPT

## 2018-04-12 PROCEDURE — 85025 COMPLETE CBC W/AUTO DIFF WBC: CPT

## 2018-04-12 NOTE — RADRPT
EXAM DATE/TIME:  04/12/2018 01:39 

 

[facility] COMPARISON:     

No previous studies available for comparison.

 

                     

INDICATIONS :     

Shortness of breath, swelling in lower extremities for 1 week

                     

 

MEDICAL HISTORY :     

None.          

 

SURGICAL HISTORY :     

None.   

 

ENCOUNTER:     

Initial                                        

 

ACUITY:     

1 week      

 

PAIN SCORE:     

0/10

 

LOCATION:     

Bilateral chest 

 

FINDINGS:     

A single view of the chest demonstrates the lungs to be symmetrically aerated without evidence of mas
s, infiltrate or effusion.  The cardiomediastinal contours are unremarkable.  Osseous structures are 
intact.

 

CONCLUSION:     No acute disease.  

 

 

 

 Renan Pratt MD on April 12, 2018 at 1:58           

Board Certified Radiologist.

 This report was verified electronically.

## 2018-04-12 NOTE — PD
HPI


Chief Complaint:  Edema


Time Seen by Provider:  01:20


Travel History


International Travel<30 days:  No


Contact w/Intl Traveler<30days:  No


Traveled to known affect area:  No





History of Present Illness


HPI


22-year-old female here for evaluation of bilateral lower extremity edema.  The 

patient reports that his leg swelling has been going on for little over a week.

  She was seen in the emergency department for bilateral ankle pain and had 

ankle x-rays performed this week that were essentially unremarkable.  She 

denies history of DVT or PE.  No chest pain or dyspnea.  Pain in her legs is 

moderate to severe, constant associated with slight numbness to her feet, worse 

with palpation and ambulation.





PFSH


Past Medical History


Medical History:  Denies Significant Hx


Diminished Hearing:  No


Immunizations Current:  Yes


Tetanus Vaccination:  < 5 Years


Influenza Vaccination:  No


Pregnant?:  Not Pregnant


LMP:  Depo injections


:  0


Para:  0





Past Surgical History


Surgical History:  No Previous Surgery





Social History


Alcohol Use:  No


Tobacco Use:  No


Substance Use:  No





Allergies-Medications


(Allergen,Severity, Reaction):  


Uncoded Allergies:  


     UNKNOWN ANTIBIOTIC (Allergy, Severe, Hives, 18)


Reported Meds & Prescriptions





Reported Meds & Active Scripts


Active


Ibuprofen 800 Mg Tab 800 Mg PO Q6HR PRN


Prednisone 20 Mg Tab 60 Mg PO DAILY 5 Days


Macrobid (Nitrofurantoin Monohydrate Macrocrystals) 100 Mg Capsule 100 Mg PO 

BID 10 Days


Ibuprofen 600 Mg Tab 600 Mg PO Q6H PRN


Reported


Depo-Provera Inj (Medroxyprogesterone Inj) 150 Mg/Ml Inj 150 Mg IM Q90D








Review of Systems


Except as stated in HPI:  all other systems reviewed are Neg





Physical Exam


Narrative


GENERAL: Well-developed, well-nourished, comfortable, no apparent distress.


SKIN: Focused skin assessment warm/dry.


HEAD: Atraumatic. Normocephalic. 


EYES: Pupils equal and round. No scleral icterus. No injection or drainage. 


ENT: No nasal bleeding or discharge.  Mucous membranes pink and moist.


NECK: Trachea midline. No JVD. 


CARDIOVASCULAR: Regular rate and rhythm.  Bilateral dorsalis pedis pulses are 

brisk and equal.


RESPIRATORY: No accessory muscle use. Clear to auscultation. Breath sounds 

equal bilaterally. 


GASTROINTESTINAL: Abdomen soft, non-tender, nondistended. Hepatic and splenic 

margins not palpable. 


MUSCULOSKELETAL: No obvious deformities. No clubbing.  No cyanosis.  Mild 

bilateral pedal edema from foot to knee.  Bilateral calves are moderately 

tender.  All compartments in bilateral lower extremities are supple.  Bilateral 

ankles are also moderately tender without obvious bony deformity, with normal 

range of motion.


NEUROLOGICAL: Awake and alert. No obvious cranial nerve deficits.  Motor 

grossly within normal limits. Normal speech.


PSYCHIATRIC: Appropriate mood and affect; insight and judgment normal.





Data


Data


Last Documented VS





Vital Signs








  Date Time  Temp Pulse Resp B/P (MAP) Pulse Ox O2 Delivery O2 Flow Rate FiO2


 


18 00:26 98.2 116 18 148/67 (94) 100   








Orders





 Orders


Complete Blood Count With Diff (18 01:24)


Comprehensive Metabolic Panel (18:24)


B-Type Natriuretic Peptide (18:24)


Act Partial Throm Time (Ptt) (18:24)


Prothrombin Time / Inr (Pt) (18:24)


Iv Access Insert/Monitor (18:24)


Ecg Monitoring (18:24)


Oximetry (18 01:24)


Oxygen Administration (18 01:24)


Chest, Single Ap (18 01:24)


Sodium Chloride 0.9% Flush (Ns Flush) (18 01:30)


Beta Hcg (Quant/Titer) (18 01:24)


Us Leg Venous Doppler Bilat (18 )


Ketorolac Inj (Toradol Inj) (18 03:00)





Labs





Laboratory Tests








Test


  18


01:35


 


White Blood Count 7.4 TH/MM3 


 


Red Blood Count 4.52 MIL/MM3 


 


Hemoglobin 12.5 GM/DL 


 


Hematocrit 37.0 % 


 


Mean Corpuscular Volume 82.0 FL 


 


Mean Corpuscular Hemoglobin 27.6 PG 


 


Mean Corpuscular Hemoglobin


Concent 33.6 % 


 


 


Red Cell Distribution Width 14.3 % 


 


Platelet Count 384 TH/MM3 


 


Mean Platelet Volume 7.3 FL 


 


Neutrophils (%) (Auto) 59.5 % 


 


Lymphocytes (%) (Auto) 33.2 % 


 


Monocytes (%) (Auto) 4.8 % 


 


Eosinophils (%) (Auto) 2.0 % 


 


Basophils (%) (Auto) 0.5 % 


 


Neutrophils # (Auto) 4.4 TH/MM3 


 


Lymphocytes # (Auto) 2.5 TH/MM3 


 


Monocytes # (Auto) 0.4 TH/MM3 


 


Eosinophils # (Auto) 0.1 TH/MM3 


 


Basophils # (Auto) 0.0 TH/MM3 


 


CBC Comment DIFF FINAL 


 


Differential Comment  


 


Prothrombin Time 11.1 SEC 


 


Prothromb Time International


Ratio 1.1 RATIO 


 


 


Activated Partial


Thromboplast Time 28.3 SEC 


 


 


Blood Urea Nitrogen 10 MG/DL 


 


Creatinine 0.95 MG/DL 


 


Random Glucose 86 MG/DL 


 


Total Protein 8.4 GM/DL 


 


Albumin 4.0 GM/DL 


 


Calcium Level 9.3 MG/DL 


 


Alkaline Phosphatase 68 U/L 


 


Aspartate Amino Transf


(AST/SGOT) 26 U/L 


 


 


Alanine Aminotransferase


(ALT/SGPT) 32 U/L 


 


 


Total Bilirubin 0.4 MG/DL 


 


Sodium Level 141 MEQ/L 


 


Potassium Level 3.5 MEQ/L 


 


Chloride Level 106 MEQ/L 


 


Carbon Dioxide Level 29.2 MEQ/L 


 


Anion Gap 6 MEQ/L 


 


Estimat Glomerular Filtration


Rate 91 ML/MIN 


 


 


B-Type Natriuretic Peptide


  LESS THAN 2


PG/ML


 


Human Chorionic Gonadotropin,


Quant LESS THAN 1


MIU/ML











MDM


Medical Decision Making


Medical Screen Exam Complete:  Yes


Emergency Medical Condition:  Yes


Differential Diagnosis


DVT, fluid overload, venous insufficiency, gout


Narrative Course


Vital signs reviewed.  Heart rate improved from 116 to 86 without any 

intervention.





CBC is unremarkable.


CMP is unremarkable.


Beta-hCG is negative.


BNP is less than 2.





Chest x-ray shows no acute disease.





Bilateral lower extremity venous duplex is negative for DVT.





The patient was made aware of all findings.  She is resting comfortably.  There 

are no signs of infection on exam.  I advised that she keep her legs elevated 

and that she purchase compression stockings.  She was advised to follow-up with 

a primary care physician this week.  She was informed on when to return to the 

emergency department.  She verbalizes understanding and agreement with plan.





Diagnosis





 Primary Impression:  


 Bilateral lower extremity edema


Referrals:  


[facility]


3 days





Primary Care Physician


3 days





***Additional Instructions:  


Follow-up with a primary care physician this week.


Return to the emergency department for worsening symptoms or any other concerns.


Disposition:  01 DISCHARGE HOME


Condition:  Stable











Freddie Overton MD 2018 01:27

## 2018-04-12 NOTE — RADRPT
EXAM DATE/TIME:  04/12/2018 01:40 

 

[facility] COMPARISON:     

No previous studies available for comparison.

        

 

 

INDICATIONS :                

Bilateral leg swelling, numbness.

            

 

MEDICAL HISTORY :        

Bilateral leg swelling, numbness.

 

SURGICAL HISTORY :     

None. 

 

ENCOUNTER:     

Initial

 

ACUITY:     

1 week

 

PAIN SCORE:      

10/10

 

LOCATION:        

leg.

                       

 

TECHNIQUE:     

Venous ultrasound of the left and right leg was performed from the inguinal ligament to the proximal 
calf.  Real-time, color Doppler and spectral tracing, compression and augmentation techniques were us
ed.  

 

FINDINGS:     

 

RIGHT LEG:     

There is normal compressibility of the deep venous system from the inguinal region to the proximal ca
lf.  No echogenic clot is seen in the lumen of the common femoral, femoral, popliteal, and posterior 
tibial veins.  There is a normal response of the venous system to proximal and distal augmentation an
d respiration.  

 

LEFT LEG:     

There is normal compressibility of the deep venous system from the inguinal region to the proximal ca
lf.  No echogenic clot is seen in the lumen of the common femoral, femoral, popliteal, and posterior 
tibial veins.  There is a normal response of the venous system to proximal and distal augmentation an
d respiration.  

 

CONCLUSION:     

No DVT in either leg

.

 

 

 

 Renan Pratt MD on April 12, 2018 at 2:31           

Board Certified Radiologist.

 This report was verified electronically.